# Patient Record
Sex: FEMALE | Race: OTHER | HISPANIC OR LATINO | Employment: UNEMPLOYED | URBAN - METROPOLITAN AREA
[De-identification: names, ages, dates, MRNs, and addresses within clinical notes are randomized per-mention and may not be internally consistent; named-entity substitution may affect disease eponyms.]

---

## 2018-11-17 ENCOUNTER — APPOINTMENT (EMERGENCY)
Dept: ULTRASOUND IMAGING | Facility: HOSPITAL | Age: 58
End: 2018-11-17

## 2018-11-17 ENCOUNTER — HOSPITAL ENCOUNTER (EMERGENCY)
Facility: HOSPITAL | Age: 58
Discharge: HOME | End: 2018-11-17
Attending: EMERGENCY MEDICINE

## 2018-11-17 ENCOUNTER — APPOINTMENT (EMERGENCY)
Dept: RADIOLOGY | Facility: HOSPITAL | Age: 58
End: 2018-11-17

## 2018-11-17 VITALS
DIASTOLIC BLOOD PRESSURE: 70 MMHG | RESPIRATION RATE: 16 BRPM | BODY MASS INDEX: 22.16 KG/M2 | OXYGEN SATURATION: 100 % | SYSTOLIC BLOOD PRESSURE: 148 MMHG | HEIGHT: 65 IN | WEIGHT: 133 LBS | TEMPERATURE: 98 F | HEART RATE: 72 BPM

## 2018-11-17 DIAGNOSIS — M25.561 RIGHT KNEE PAIN: Primary | ICD-10-CM

## 2018-11-17 PROCEDURE — 96372 THER/PROPH/DIAG INJ SC/IM: CPT

## 2018-11-17 PROCEDURE — 99284 EMERGENCY DEPT VISIT MOD MDM: CPT

## 2018-11-17 PROCEDURE — 73564 X-RAY EXAM KNEE 4 OR MORE: CPT

## 2018-11-17 PROCEDURE — 93971 EXTREMITY STUDY: CPT

## 2018-11-17 RX ORDER — KETOROLAC TROMETHAMINE 30 MG/ML
15 INJECTION, SOLUTION INTRAMUSCULAR; INTRAVENOUS ONCE
Status: COMPLETED | OUTPATIENT
Start: 2018-11-17 | End: 2018-11-17

## 2018-11-17 RX ADMIN — KETOROLAC TROMETHAMINE 15 MG: 30 INJECTION, SOLUTION INTRAMUSCULAR at 14:38

## 2018-11-17 NOTE — ED PROVIDER NOTES
History  Chief Complaint   Patient presents with    Knee Pain     R>L knee pain; no injury     56-year-old female with right leg pain for 3 weeks  Pain extends from right lateral thigh down to her right calf  Primarily seems to be in the knee  Worse with bending and twisting of the knee  Worse with bearing weight onto the knee  She has no swelling in the calf swelling of the knee  Began having mild redness on the anterior aspect of the knee, not diffuse  No warmth, no fevers chills nausea vomiting  No prior injuries or surgeries to this knee or affected leg  No recent strenuous activity  Denies any recent travels traumas or surgeries  No history of PE or DVT  Pain got worse today leading her to be unable bear weight prompting her ER visit  History provided by:  Patient   used: No    Leg Pain   Location:  Leg  Time since incident:  3 weeks  Injury: no    Leg location:  R leg  Pain details:     Quality:  Aching    Radiates to:  Does not radiate    Severity:  Moderate    Onset quality:  Gradual    Duration:  3 weeks    Timing:  Constant    Progression:  Worsening  Chronicity:  New  Dislocation: no    Foreign body present:  No foreign bodies  Tetanus status:  Unknown  Prior injury to area:  No  Relieved by:  Rest  Worsened by:  Bearing weight, extension and flexion  Ineffective treatments:  None tried  Associated symptoms: no back pain, no decreased ROM, no fatigue, no fever, no itching, no muscle weakness, no neck pain, no numbness, no stiffness, no swelling and no tingling    Risk factors: no concern for non-accidental trauma, no frequent fractures, no known bone disorder, no obesity and no recent illness        None       History reviewed  No pertinent past medical history  History reviewed  No pertinent surgical history  History reviewed  No pertinent family history  I have reviewed and agree with the history as documented      Social History   Substance Use Topics    Smoking status: Never Smoker    Smokeless tobacco: Never Used    Alcohol use No        Review of Systems   Constitutional: Negative for activity change, appetite change, chills, diaphoresis, fatigue, fever and unexpected weight change  HENT: Negative for congestion, rhinorrhea, sinus pressure, sore throat and trouble swallowing  Eyes: Negative for photophobia and visual disturbance  Respiratory: Negative for apnea, cough, choking, chest tightness, shortness of breath, wheezing and stridor  Cardiovascular: Negative for chest pain, palpitations and leg swelling  Gastrointestinal: Negative for abdominal distention, abdominal pain, blood in stool, constipation, diarrhea, nausea and vomiting  Genitourinary: Negative for decreased urine volume, difficulty urinating, dysuria, enuresis, flank pain, frequency, hematuria and urgency  Musculoskeletal: Negative for arthralgias, back pain, myalgias, neck pain, neck stiffness and stiffness  Skin: Negative for color change, itching, pallor, rash and wound  Allergic/Immunologic: Negative  Neurological: Negative for dizziness, tremors, syncope, weakness, light-headedness, numbness and headaches  Hematological: Negative  Psychiatric/Behavioral: Negative  All other systems reviewed and are negative  Physical Exam  Physical Exam   Constitutional: She is oriented to person, place, and time  She appears well-developed and well-nourished  Non-toxic appearance  She does not have a sickly appearance  She does not appear ill  No distress  HENT:   Head: Normocephalic and atraumatic  Eyes: Pupils are equal, round, and reactive to light  EOM and lids are normal    Neck: Normal range of motion  Neck supple  Cardiovascular: Normal rate, regular rhythm, S1 normal, S2 normal, normal heart sounds, intact distal pulses and normal pulses  Exam reveals no gallop, no distant heart sounds, no friction rub and no decreased pulses      No murmur heard   Pulses:       Radial pulses are 2+ on the right side, and 2+ on the left side  Pulmonary/Chest: Effort normal and breath sounds normal  No accessory muscle usage  No apnea, no tachypnea and no bradypnea  No respiratory distress  She has no decreased breath sounds  She has no wheezes  She has no rhonchi  She has no rales  Abdominal: Soft  Normal appearance  She exhibits no distension  There is no tenderness  There is no rigidity, no rebound and no guarding  Musculoskeletal: Normal range of motion  She exhibits no edema or deformity  Right knee: She exhibits erythema (suprapatellar area  no fluctuance, no warmth ) and bony tenderness  She exhibits normal range of motion, no swelling, no effusion, no ecchymosis, no deformity and no laceration  Tenderness found  Medial joint line and lateral joint line tenderness noted  Legs:  Neurological: She is alert and oriented to person, place, and time  No cranial nerve deficit  GCS eye subscore is 4  GCS verbal subscore is 5  GCS motor subscore is 6  GCS 15  AAOx3  Ambulating in department without difficulty  CN II-XII grossly intact  No focal neuro deficits  Skin: Skin is warm, dry and intact  No rash noted  She is not diaphoretic  No erythema  No pallor  Psychiatric: Her speech is normal    Nursing note and vitals reviewed        Vital Signs  ED Triage Vitals [11/17/18 1313]   Temperature Pulse Respirations Blood Pressure SpO2   98 °F (36 7 °C) 72 16 148/70 100 %      Temp Source Heart Rate Source Patient Position - Orthostatic VS BP Location FiO2 (%)   Oral Monitor Sitting Left arm --      Pain Score       Worst Possible Pain           Vitals:    11/17/18 1313   BP: 148/70   Pulse: 72   Patient Position - Orthostatic VS: Sitting       Visual Acuity      ED Medications  Medications   ketorolac (TORADOL) injection 15 mg (15 mg Intramuscular Given 11/17/18 1438)       Diagnostic Studies  Results Reviewed     None                 VAS lower limb venous duplex study, unilateral/limited   Final Result by Mervin Tucker MD (11/18 1015)      XR knee 4+ vw right injury   ED Interpretation by Jackson Denson PA-C (11/17 4596)   No acute osseous abnormalities      Final Result by Sage Roberto MD (11/18 1012)      No acute osseous abnormality  Mild osteoarthritic degenerative changes of the right knee joint  Workstation performed: [id number]                    Procedures  Procedures       Phone Contacts  ED Phone Contact    ED Course  ED Course as of Nov 18 1453   Sat Nov 17, 2018   1502 Prelim DVT report negative for acute abnormalities  MDM  Number of Diagnoses or Management Options  Right knee pain: new and requires workup  Diagnosis management comments: Differential diagnosis including but not limited to: Gout, arthritis, Lyme disease, Lupus, rheumatoid arthritis, cellulitis, bursitis, tendinitis, dislocation, fracture, sprain, strain, DVT, Baker's cyst; doubt septic arthritis or arterial occlusion  Plan:  DVT study, x-ray  Dispo pending  Amount and/or Complexity of Data Reviewed  Tests in the radiology section of CPT®: ordered and reviewed  Independent visualization of images, tracings, or specimens: yes    Risk of Complications, Morbidity, and/or Mortality  Presenting problems: moderate  Management options: low  General comments: 50-year-old female with leg pain and knee pain  X-rays unremarkable  Ultrasound of right lower extremity is negative for acute DVT  She does have very mild redness along the suprapatellar area, could be bursitis  I have very low suspicion for septic arthritis given 3 weeks of symptoms, no fevers  She has no risk factors for septic arthritis  She is given analgesia her pain is improved  Recommended she follow up with Orthopedics/sports medicine as needed  Weightbearing as tolerated  Crutches for comfort  Return parameters provided   Pt understands and agrees with plan       Patient Progress  Patient progress: stable    CritCare Time    Disposition  Final diagnoses:   Right knee pain     Time reflects when diagnosis was documented in both MDM as applicable and the Disposition within this note     Time User Action Codes Description Comment    11/17/2018  3:47 PM Esther Gutierrez Add [M25 561] Right knee pain       ED Disposition     ED Disposition Condition Comment    Discharge  Oxana Dawkins discharge to home/self care  Condition at discharge: Good        Follow-up Information     Follow up With Specialties Details Why Contact Info    Eileen Julien,  Family Medicine   [address] [facility] [address] [id number] [facility] [address]  [facility], [address] [facility] Medicine Call for recurrent knee pain [address] [facility]  [address]  [place] [id number] [phone]            There are no discharge medications for this patient  No discharge procedures on file      ED Provider  Electronically Signed by           Yogesh Crawley PA-C  11/18/18 3879

## 2018-11-17 NOTE — DISCHARGE INSTRUCTIONS
Return to the Emergency Department sooner if increased pain, swelling, numbness, weakness, vomiting, difficulty breathing, redness  Ice, elevate  Knee Pain   WHAT YOU NEED TO KNOW:   Knee pain may start suddenly, or it may be a long-term problem  You may have pain on the side, front, or back of your knee  You may have knee stiffness and swelling  You may hear popping sounds or feel like your knee is giving way or locking up as you walk  You may feel pain when you sit, stand, walk, or climb up and down stairs  Knee pain can be caused by conditions such as obesity, inflammation, or strains or tears in ligaments or tendons  DISCHARGE INSTRUCTIONS:   Follow up with your healthcare provider within 24 hours or as directed: You may need follow-up treatments, such as steroid injections to decrease pain  Write down your questions so you remember to ask them during your visits  Self-care:   · Rest  your knee so it can heal  Limit activities that increase your pain  · Ice  can help reduce swelling  Wrap ice in a towel and put it on your knee for as long and as often as directed  · Compression  with a brace or bandage can help reduce swelling  Use a brace or bandage only as directed  · Elevation  helps decrease pain and swelling  Elevate your knee while you are sitting or lying down  Prop your leg on pillows to keep your knee above the level of your heart  Medicines:   · NSAIDs  help decrease swelling and pain or fever  This medicine is available with or without a doctor's order  NSAIDs can cause stomach bleeding or kidney problems in certain people  If you take blood thinner medicine, always ask your healthcare provider if NSAIDs are safe for you  Always read the medicine label and follow directions  · Acetaminophen  decreases pain and fever  It is available without a doctor's order  Ask how much to take and when to take it  Follow directions   Acetaminophen can cause liver damage if not taken correctly  · Take your medicine as directed  Contact your healthcare provider if you think your medicine is not helping or if you have side effects  Tell him or her if you are allergic to any medicine  Keep a list of the medicines, vitamins, and herbs you take  Include the amounts, and when and why you take them  Bring the list or the pill bottles to follow-up visits  Carry your medicine list with you in case of an emergency  Exercise as directed: You may need to see a physical therapist or do recommended exercises to improve movement and decrease your pain  You may be directed to walk, swim, or ride a bike  Follow your exercise plan exactly as directed to avoid further injury  Contact your healthcare provider if:   · You have questions or concerns about your condition or care  Return to the emergency department if:   · Your pain is worse, even after treatment  · You cannot bend or straighten your leg completely  · The swelling around your knee does not go down even with treatment  · Your knee is painful and hot to the touch  © 2017 [address] Information is for End User's use only and may not be sold, redistributed or otherwise used for commercial purposes  All illustrations and images included in CareNotes® are the copyrighted property of [employer]  or Daniel Herrera  The above information is an  only  It is not intended as medical advice for individual conditions or treatments  Talk to your doctor, nurse or pharmacist before following any medical regimen to see if it is safe and effective for you

## 2018-11-18 PROCEDURE — 93971 EXTREMITY STUDY: CPT | Performed by: SURGERY
